# Patient Record
Sex: FEMALE | Race: WHITE | HISPANIC OR LATINO | URBAN - METROPOLITAN AREA
[De-identification: names, ages, dates, MRNs, and addresses within clinical notes are randomized per-mention and may not be internally consistent; named-entity substitution may affect disease eponyms.]

---

## 2020-02-19 ENCOUNTER — WALK IN (OUTPATIENT)
Dept: URGENT CARE | Age: 40
End: 2020-02-19

## 2020-02-19 DIAGNOSIS — Z23 NEED FOR VACCINATION: Primary | ICD-10-CM

## 2020-02-19 PROCEDURE — X0946 INFLUENZA QUADRIVALENT SPLIT PRES FREE 0.5 ML VACC, IM (FLULAVAL,FLUARIX,FLUZONE): HCPCS | Performed by: OBSTETRICS & GYNECOLOGY

## 2021-05-28 ENCOUNTER — OFFICE VISIT (OUTPATIENT)
Dept: SURGERY | Facility: CLINIC | Age: 41
End: 2021-05-28
Payer: COMMERCIAL

## 2021-05-28 ENCOUNTER — HOSPITAL ENCOUNTER (OUTPATIENT)
Age: 41
Discharge: HOME | End: 2021-05-28
Payer: COMMERCIAL

## 2021-05-28 VITALS
DIASTOLIC BLOOD PRESSURE: 94 MMHG | HEART RATE: 100 BPM | TEMPERATURE: 98 F | SYSTOLIC BLOOD PRESSURE: 128 MMHG | RESPIRATION RATE: 14 BRPM | OXYGEN SATURATION: 99 %

## 2021-05-28 VITALS
WEIGHT: 131.38 LBS | TEMPERATURE: 98 F | BODY MASS INDEX: 23.28 KG/M2 | DIASTOLIC BLOOD PRESSURE: 80 MMHG | HEIGHT: 63 IN | SYSTOLIC BLOOD PRESSURE: 115 MMHG | HEART RATE: 87 BPM

## 2021-05-28 DIAGNOSIS — K64.5 THROMBOSED EXTERNAL HEMORRHOID: ICD-10-CM

## 2021-05-28 DIAGNOSIS — K62.89 RECTAL PAIN: Primary | ICD-10-CM

## 2021-05-28 DIAGNOSIS — K64.5 EXTERNAL THROMBOSED HEMORRHOIDS: Primary | ICD-10-CM

## 2021-05-28 PROCEDURE — 99203 OFFICE O/P NEW LOW 30 MIN: CPT

## 2021-05-28 PROCEDURE — 3074F SYST BP LT 130 MM HG: CPT | Performed by: SURGERY

## 2021-05-28 PROCEDURE — 99205 OFFICE O/P NEW HI 60 MIN: CPT | Performed by: SURGERY

## 2021-05-28 PROCEDURE — 46320 REMOVAL OF HEMORRHOID CLOT: CPT | Performed by: SURGERY

## 2021-05-28 PROCEDURE — 3008F BODY MASS INDEX DOCD: CPT | Performed by: SURGERY

## 2021-05-28 PROCEDURE — 3079F DIAST BP 80-89 MM HG: CPT | Performed by: SURGERY

## 2021-05-28 RX ORDER — CETIRIZINE HYDROCHLORIDE 10 MG/1
10 TABLET ORAL DAILY
COMMUNITY

## 2021-05-28 RX ORDER — DOCUSATE SODIUM 100 MG/1
100 CAPSULE, LIQUID FILLED ORAL 2 TIMES DAILY
Qty: 28 CAPSULE | Refills: 0 | Status: SHIPPED | OUTPATIENT
Start: 2021-05-28 | End: 2021-06-11

## 2021-05-28 NOTE — ED INITIAL ASSESSMENT (HPI)
C/o rectal discomfort on Tuesday. Had heavy lifting at work on Wednesday and Thursday had shooting rectal pain.

## 2021-05-28 NOTE — CM/SW NOTE
Pt will be going directly to Dr. Joao Sales office for appointment.  Informed [facility] NP.

## 2021-05-28 NOTE — ED PROVIDER NOTES
Patient Seen in: [facility]      History   Patient presents with:  Anal Problem    Stated Complaint: PERSONAL ISSUE/FEMALE    HPI/Subjective:   HPI  13-year-old female presents with pain, swelling, and a possible hemorrhoid to her rectal what appears to be external hemorrhoid engorged at the 3 o'clock position with no drainage. Tenderness to palpation. Skin:     General: Skin is warm and dry. Capillary Refill: Capillary refill takes less than 2 seconds.    Neurological:      Mental S

## 2021-05-30 NOTE — H&P
New Patient Visit Note       Active Problems      1. Rectal pain    2.  Thrombosed external hemorrhoid        Chief Complaint   Patient presents with:  Hemorrhoids: External thrombosed hemorrhoids - c/o Sharp pain at a 4/10 that escalates through out the [employer] for diaphoresis, fever and unexpected weight change. HENT: Negative for congestion, nosebleeds, sore throat and trouble swallowing. Eyes: Negative for pain, discharge, redness and visual disturbance.    Respiratory: Negative for cough, shortness of jorge the left lateral quadrant. Anoscopy was not performed due to patient discomfort. There is no evidence of fissure, fistula, abscess formation  Separate anoscopy note is dictated.           History of Present Illness     53-year-old female who is here today also reviewed. All questions from the patient were answered in detail. The patient did verbalize understanding and does not have any further questions at this time. The patient does wish to proceed with the proposed procedure.       No orders of the defi

## 2021-05-30 NOTE — PROCEDURES
Procedure note    Date of procedure-May 29, 2021    Preoperative diagnosis-acutely thrombosed external hemorrhoid left lateral quadrant    Indication for procedure-rectal pain    Postoperative diagnosis-same    Procedure-   incision, drainage and debrideme

## 2022-11-18 ENCOUNTER — HOSPITAL ENCOUNTER (OUTPATIENT)
Age: 42
Discharge: HOME | End: 2022-11-18
Payer: COMMERCIAL

## 2022-11-18 VITALS
HEART RATE: 104 BPM | HEIGHT: 63 IN | BODY MASS INDEX: 19.49 KG/M2 | TEMPERATURE: 98 F | WEIGHT: 110 LBS | DIASTOLIC BLOOD PRESSURE: 88 MMHG | SYSTOLIC BLOOD PRESSURE: 155 MMHG | RESPIRATION RATE: 18 BRPM | OXYGEN SATURATION: 99 %

## 2022-11-18 DIAGNOSIS — R23.8 VESICULAR LESION: Primary | ICD-10-CM

## 2022-11-18 LAB
B-HCG UR QL: NEGATIVE
POCT BILIRUBIN URINE: NEGATIVE
POCT GLUCOSE URINE: NEGATIVE MG/DL
POCT KETONE URINE: NEGATIVE MG/DL
POCT LEUKOCYTE ESTERASE URINE: NEGATIVE
POCT NITRITE URINE: NEGATIVE
POCT PH URINE: 6.5 (ref 5–8)
POCT PROTEIN URINE: NEGATIVE MG/DL
POCT SPECIFIC GRAVITY URINE: 1.02
POCT URINE CLARITY: CLEAR
POCT URINE COLOR: YELLOW
POCT UROBILINOGEN URINE: 0.2 MG/DL

## 2022-11-18 PROCEDURE — 87491 CHLMYD TRACH DNA AMP PROBE: CPT | Performed by: NURSE PRACTITIONER

## 2022-11-18 PROCEDURE — 81025 URINE PREGNANCY TEST: CPT

## 2022-11-18 PROCEDURE — 87591 N.GONORRHOEAE DNA AMP PROB: CPT | Performed by: NURSE PRACTITIONER

## 2022-11-18 PROCEDURE — 81002 URINALYSIS NONAUTO W/O SCOPE: CPT | Performed by: NURSE PRACTITIONER

## 2022-11-18 PROCEDURE — 99214 OFFICE O/P EST MOD 30 MIN: CPT

## 2022-11-18 PROCEDURE — 87480 CANDIDA DNA DIR PROBE: CPT | Performed by: NURSE PRACTITIONER

## 2022-11-18 PROCEDURE — 87660 TRICHOMONAS VAGIN DIR PROBE: CPT | Performed by: NURSE PRACTITIONER

## 2022-11-18 PROCEDURE — 87529 HSV DNA AMP PROBE: CPT | Performed by: NURSE PRACTITIONER

## 2022-11-18 PROCEDURE — 87510 GARDNER VAG DNA DIR PROBE: CPT | Performed by: NURSE PRACTITIONER

## 2022-11-18 RX ORDER — VALACYCLOVIR HYDROCHLORIDE 1 G/1
1000 TABLET, FILM COATED ORAL EVERY 12 HOURS
Qty: 20 TABLET | Refills: 0 | Status: SHIPPED | OUTPATIENT
Start: 2022-11-18 | End: 2022-11-28

## 2022-11-18 NOTE — DISCHARGE INSTRUCTIONS
Genital culture will be sent to lab. You will be notified of any abnormalities to the labs indicate a need for change in treatment plan. Results will also be uploaded onto your MyChart. Please follow-up with your primary care provider within 1 week. Refrain from unprotected intercourse until lesion is dried up and gone. If negative for herpes, you may discontinue taking the Valtrex.

## 2022-11-18 NOTE — ED INITIAL ASSESSMENT (HPI)
Pt had unprotected sex Monday , she noted a bump on vaginal area yesterday .  States bump ruptured  Noted pus like discharge

## 2022-11-19 NOTE — ED NOTES
Pt called back and vaginitis panel and treatment reviewed with patient. Pt verbalized understanding.

## 2022-11-19 NOTE — ED NOTES
Attempted to contact the patient but was unsuccessful. Left a message for patient to call back RE: positive sebastian. Per Dr. Seth Garcia, patient to take OTC monistat treatment and f/u with PCP.

## 2022-11-21 LAB
C TRACH DNA SPEC QL NAA+PROBE: NEGATIVE
HSV1 DNA SPEC QL NAA+PROBE: NEGATIVE
HSV2 DNA SPEC QL NAA+PROBE: NEGATIVE
N GONORRHOEA DNA SPEC QL NAA+PROBE: NEGATIVE

## 2022-11-21 RX ORDER — FLUCONAZOLE 150 MG/1
150 TABLET ORAL ONCE
Qty: 1 TABLET | Refills: 0 | Status: SHIPPED | OUTPATIENT
Start: 2022-11-21 | End: 2022-11-21